# Patient Record
Sex: FEMALE | Race: WHITE | URBAN - METROPOLITAN AREA
[De-identification: names, ages, dates, MRNs, and addresses within clinical notes are randomized per-mention and may not be internally consistent; named-entity substitution may affect disease eponyms.]

---

## 2018-06-04 ENCOUNTER — THERAPY VISIT (OUTPATIENT)
Dept: PHYSICAL THERAPY | Facility: CLINIC | Age: 73
End: 2018-06-04
Payer: COMMERCIAL

## 2018-06-04 DIAGNOSIS — M25.511 ACUTE PAIN OF RIGHT SHOULDER: Primary | ICD-10-CM

## 2018-06-04 PROCEDURE — 97110 THERAPEUTIC EXERCISES: CPT | Mod: GP | Performed by: PHYSICAL THERAPIST

## 2018-06-04 PROCEDURE — 97161 PT EVAL LOW COMPLEX 20 MIN: CPT | Mod: GP | Performed by: PHYSICAL THERAPIST

## 2018-06-04 NOTE — LETTER
[facility]  [address] [address] [place] [address]  [phone]    2018    Re: Silvana Ricardo   :   1945  MRN:  [id number]   REFERRING PHYSICIAN:   Lawanda Martinez    [facility]    Date of Initial Evaluation:  2018  Visits:  Rxs Used: 1  Reason for Referral:  Acute pain of right shoulder    EVALUATION SUMMARY    [facility] Initial Evaluation  Subjective:  Patient is a 72 year old female presenting with rehab right shoulder hpi. The history is provided by the patient. No  was used.   Silvana Ricardo is a 72 year old female with a right shoulder condition.  Condition occurred with:  A fall.  Condition occurred: at home (tripped on bed spread landing on wood floor).  This is a new condition  2018 fell fracture right humerus - Marisela was hospitalized for 3 days and received rehab at [facility] 3 weeks.  Marisela wore sling for 6 weeks. Patient reports pain:  In the joint.    Pain is described as aching and is intermittent and reported as 3/10.  Associated symptoms:  Loss of strength. Pain is the same all the time.  Symptoms are exacerbated by using arm behind back and using arm overhead and relieved by rest.  Since onset symptoms are gradually improving.  Special tests:  X-ray.  Previous treatment includes physical therapy.  There was significant improvement following previous treatment.  General health as reported by patient is good. Pertinent medical history includes:  History of fractures, diabetes, overweight, high blood pressure and depression.  Medical allergies: no.  Other surgeries include:  Orthopedic surgery.  Current medications:  Anti-depressants and high blood pressure medication.  Current occupation is Retired.     Barriers include:  Lives alone.  Red flags:  None as reported by the patient.    Objective:  Standing Alignment:    Shoulder/UE:  Rounded shoulders, protracted scapula L and  protracted scapula R  Gait:    Gait Type:  Antalgic   Assistive Devices:  Cane  Flexibility/Screens:   Positive screens:  Shoulder  Upper Extremity:    Decreased left upper extremity flexibility at:  Pectoralis Major and Pectoralis Minor  Decreased right upper extremity flexibility present at:  Pectoralis Major and Pectoralis Minor          Shoulder Evaluation:  ROM:  AROM:    Flexion:  Left:  155    Right:  115+  Abduction:  Left: 160   Right:  150  Flexion/External Rotation:  Left:  T1    Right:  T1+  Extension/Internal Rotation:  Left:  T10    Right:  L1+    PROM:    Flexion:  Right: 150    Abduction:  Right:  150  Internal Rotation:  Right:  35  External Rotation:  Right:  35  Strength:  : Right Shoulder Pain & Weak with IR (4+) , ABD (4-) and Flex (4+)  Stability Testing:  not assessed  Special Tests:    Right shoulder positive for the following special tests:Rotator cuff tear  Palpation:    Right shoulder tenderness present at: Supraspinatus and Subscapularis  Right shoulder tenderness not present at:Infraspinatus or Teres Minor  Mobility Tests:    Glenohumeral inferior right:  Hypomobile      Assessment/Plan:    Patient is a 72 year old female with right side shoulder complaints.    Patient has the following significant findings with corresponding treatment plan.                Diagnosis 1:  Right Shoulder Pain & Weakness post-humeral fracture  Pain -  manual therapy, self management, education and home program  Decreased ROM/flexibility - manual therapy, therapeutic exercise and home program  Decreased joint mobility - manual therapy, therapeutic exercise and home program  Decreased strength - therapeutic exercise, therapeutic activities and home program  Inflammation - self management/home program  Impaired muscle performance - neuro re-education and home program  Decreased function - therapeutic activities and home program    Therapy Evaluation Codes:   1) History comprised of:   Personal factors that  impact the plan of care:      Age and Time since onset of symptoms.    Comorbidity factors that impact the plan of care are:      Diabetes, Depression, High blood pressure, Overweight and History of fractures.     Medications impacting care: Anti-depressant and High blood pressure.  2) Examination of Body Systems comprised of:   Body structures and functions that impact the plan of care:      Shoulder.   Activity limitations that impact the plan of care are:      Reaching.  3) Clinical presentation characteristics are:   Stable/Uncomplicated.  4) Decision-Making    Low complexity using standardized patient assessment instrument and/or measureable assessment of functional outcome.  Cumulative Therapy Evaluation is: Low complexity.    Previous and current functional limitations:  (See Goal Flow Sheet for this information)    Short term and Long term goals: (See Goal Flow Sheet for this information)     Communication ability:  Patient appears to be able to clearly communicate and understand verbal and written communication and follow directions correctly.  Treatment Explanation - The following has been discussed with the patient:   RX ordered/plan of care  Anticipated outcomes  Possible risks and side effects  This patient would benefit from PT intervention to resume normal activities.   Rehab potential is good.    Frequency:  1 X week, once daily  Duration:  for 6 weeks  Discharge Plan:  Achieve all LTG.  Independent in home treatment program.  Return to previous functional level by discharge.  Reach maximal therapeutic benefit.    Thank you for your referral.    INQUIRIES  Therapist: Marah Adler, PT   [address] [address]  Phone: [phone]  Fax: [phone]

## 2018-06-04 NOTE — PROGRESS NOTES
[facility] Initial Evaluation  Subjective:  Patient is a 72 year old female presenting with rehab right shoulder hpi. The history is provided by the patient. No  was used.   Silvana Ricardo is a 72 year old female with a right shoulder condition.  Condition occurred with:  A fall.  Condition occurred: at home (tripped on bed spread landing on wood floor).  This is a new condition  Feb 23, 2018 fell fracture right humerus - Marisela was hospitalized for 3 days and received rehab at [facility] 3 weeks.  Marisela wore sling for 6 weeks.   .    Patient reports pain:  In the joint.    Pain is described as aching and is intermittent and reported as 3/10.  Associated symptoms:  Loss of strength. Pain is the same all the time.  Symptoms are exacerbated by using arm behind back and using arm overhead and relieved by rest.  Since onset symptoms are gradually improving.  Special tests:  X-ray.  Previous treatment includes physical therapy.  There was significant improvement following previous treatment.  General health as reported by patient is good.                  Barriers include:  Lives alone.    Red flags:  None as reported by the patient.                        Objective:  Standing Alignment:      Shoulder/UE:  Rounded shoulders, protracted scapula L and protracted scapula R              Gait:    Gait Type:  Antalgic   Assistive Devices:  Cane      Flexibility/Screens:   Positive screens:  Shoulder  Upper Extremity:    Decreased left upper extremity flexibility at:  Pectoralis Major and Pectoralis Minor    Decreased right upper extremity flexibility present at:  Pectoralis Major and Pectoralis Minor                           Shoulder Evaluation:  ROM:  AROM:    Flexion:  Left:  155    Right:  115+    Abduction:  Left: 160   Right:  150                Flexion/External Rotation:  Left:  T1    Right:  T1+  Extension/Internal Rotation:  Left:  T10    Right:  L1+    PROM:    Flexion:   Right: 150      Abduction:  Right:  150    Internal Rotation:  Right:  35  External Rotation:  Right:  35                    Strength:  : Right Shoulder Pain & Weak with IR (4+) , ABD (4-) and Flex (4+)                      Stability Testing:  not assessed      Special Tests:      Right shoulder positive for the following special tests:Rotator cuff tear  Palpation:      Right shoulder tenderness present at: Supraspinatus and Subscapularis  Right shoulder tenderness not present at:Infraspinatus or Teres Minor  Mobility Tests:        Glenohumeral inferior right:  Hypomobile                                             General     ROS    Assessment/Plan:    Patient is a 72 year old female with right side shoulder complaints.    Patient has the following significant findings with corresponding treatment plan.                Diagnosis 1:  Right Shoulder Pain & Weakness post-humeral fracture  Pain -  manual therapy, self management, education and home program  Decreased ROM/flexibility - manual therapy, therapeutic exercise and home program  Decreased joint mobility - manual therapy, therapeutic exercise and home program  Decreased strength - therapeutic exercise, therapeutic activities and home program  Inflammation - self management/home program  Impaired muscle performance - neuro re-education and home program  Decreased function - therapeutic activities and home program    Therapy Evaluation Codes:   1) History comprised of:   Personal factors that impact the plan of care:      Age and Time since onset of symptoms.    Comorbidity factors that impact the plan of care are:      Diabetes, Depression, High blood pressure, Overweight and History of fractures.     Medications impacting care: Anti-depressant and High blood pressure.  2) Examination of Body Systems comprised of:   Body structures and functions that impact the plan of care:      Shoulder.   Activity limitations that impact the plan of care are:       Reaching.  3) Clinical presentation characteristics are:   Stable/Uncomplicated.  4) Decision-Making    Low complexity using standardized patient assessment instrument and/or measureable assessment of functional outcome.  Cumulative Therapy Evaluation is: Low complexity.    Previous and current functional limitations:  (See Goal Flow Sheet for this information)    Short term and Long term goals: (See Goal Flow Sheet for this information)     Communication ability:  Patient appears to be able to clearly communicate and understand verbal and written communication and follow directions correctly.  Treatment Explanation - The following has been discussed with the patient:   RX ordered/plan of care  Anticipated outcomes  Possible risks and side effects  This patient would benefit from PT intervention to resume normal activities.   Rehab potential is good.    Frequency:  1 X week, once daily  Duration:  for 6 weeks  Discharge Plan:  Achieve all LTG.  Independent in home treatment program.  Return to previous functional level by discharge.  Reach maximal therapeutic benefit.    Please refer to the daily flowsheet for treatment today, total treatment time and time spent performing 1:1 timed codes.

## 2018-06-18 ENCOUNTER — THERAPY VISIT (OUTPATIENT)
Dept: PHYSICAL THERAPY | Facility: CLINIC | Age: 73
End: 2018-06-18
Payer: COMMERCIAL

## 2018-06-18 DIAGNOSIS — M25.511 ACUTE PAIN OF RIGHT SHOULDER: ICD-10-CM

## 2018-06-18 PROCEDURE — 97112 NEUROMUSCULAR REEDUCATION: CPT | Mod: GP | Performed by: PHYSICAL THERAPIST

## 2018-06-18 PROCEDURE — 97110 THERAPEUTIC EXERCISES: CPT | Mod: GP | Performed by: PHYSICAL THERAPIST

## 2018-06-29 ENCOUNTER — THERAPY VISIT (OUTPATIENT)
Dept: PHYSICAL THERAPY | Facility: CLINIC | Age: 73
End: 2018-06-29
Payer: COMMERCIAL

## 2018-06-29 DIAGNOSIS — M25.511 ACUTE PAIN OF RIGHT SHOULDER: ICD-10-CM

## 2018-06-29 PROCEDURE — 97110 THERAPEUTIC EXERCISES: CPT | Mod: GP | Performed by: PHYSICAL THERAPIST

## 2018-06-29 PROCEDURE — 97112 NEUROMUSCULAR REEDUCATION: CPT | Mod: GP | Performed by: PHYSICAL THERAPIST

## 2018-07-09 ENCOUNTER — THERAPY VISIT (OUTPATIENT)
Dept: PHYSICAL THERAPY | Facility: CLINIC | Age: 73
End: 2018-07-09
Payer: COMMERCIAL

## 2018-07-09 DIAGNOSIS — M25.511 ACUTE PAIN OF RIGHT SHOULDER: ICD-10-CM

## 2018-07-09 PROCEDURE — 97112 NEUROMUSCULAR REEDUCATION: CPT | Mod: GP | Performed by: PHYSICAL THERAPIST

## 2018-07-09 PROCEDURE — 97110 THERAPEUTIC EXERCISES: CPT | Mod: GP | Performed by: PHYSICAL THERAPIST

## 2018-07-09 NOTE — PROGRESS NOTES
Subjective:  HPI                    Objective:  System    Physical Exam    General     ROS    Assessment/Plan:    DISCHARGE REPORT    Progress reporting period is from 6-4-18 to 7-9-18.       SUBJECTIVE  Subjective changes noted by patient:  Marisela denies right shoulder pain. She is able to reach overhead with ease.     Current pain level is 0/10  .     Previous pain level was: 7/10.   Changes in function:  Yes (See Goal flowsheet attached for changes in current functional level)  Adverse reaction to treatment or activity: None    OBJECTIVE  Changes noted in objective findings: AROM right shoulder FLex = 150, Abd = 160, IR/Ext = T12 and ER/Flex = T1 w/o pain reported. Strength with MMT = 4+ with mild pain with resisted Abd @ 90 degrees.      ASSESSMENT/PLAN  Updated problem list and treatment plan: Diagnosis 1:  Right Shoulder Pain   Pain -  manual therapy, self management, education and home program  Decreased ROM/flexibility - manual therapy, therapeutic exercise and therapeutic activity  Decreased joint mobility - manual therapy, therapeutic exercise, therapeutic activity and home program  Decreased strength - therapeutic exercise, therapeutic activities and home program  Impaired muscle performance - neuro re-education and home program  Decreased function - therapeutic activities and home program  STG/LTGs have been met or progress has been made towards goals:  Yes (See Goal flow sheet completed today.)  Assessment of Progress: The patient has met all of their long term goals.  Self Management Plans:  Patient is independent in a home treatment program.  Patient is independent in self management of symptoms.  I have re-evaluated this patient and find that the nature, scope, duration and intensity of the therapy is appropriate for the medical condition of the patient.  Silvana continues to require the following intervention to meet STG and LTG's:  PT intervention is no longer required to meet  STG/LTG.    Recommendations:  This patient is ready to be discharged from therapy and continue their home treatment program.    Please refer to the daily flowsheet for treatment today, total treatment time and time spent performing 1:1 timed codes.

## 2018-07-09 NOTE — LETTER
[facility]  [address] [address] [place] [address]  [phone]    2018    Re: Silvana Ricardo   :   1945  MRN:  [id number]   REFERRING PHYSICIAN:   Lawanda Martinez    [facility]    Date of Initial Evaluation:  2018  Visits:  Rxs Used: 4  Reason for Referral:  Acute pain of right shoulder    DISCHARGE REPORT  Progress reporting period is from 18 to 18.       SUBJECTIVE  Subjective changes noted by patient:  Marisela denies right shoulder pain. She is able to reach overhead with ease.   Current pain level is 0/10.   Previous pain level was: 7/10.   Changes in function:  Yes (See Goal flowsheet attached for changes in current functional level).  Adverse reaction to treatment or activity: None    OBJECTIVE  Changes noted in objective findings: AROM right shoulder FLex = 150, Abd = 160, IR/Ext = T12 and ER/Flex = T1 w/o pain reported. Strength with MMT = 4+ with mild pain with resisted Abd @ 90 degrees.      ASSESSMENT/PLAN  Updated problem list and treatment plan: Diagnosis 1:  Right Shoulder Pain   Pain -  manual therapy, self management, education and home program  Decreased ROM/flexibility - manual therapy, therapeutic exercise and therapeutic activity  Decreased joint mobility - manual therapy, therapeutic exercise, therapeutic activity and home program  Decreased strength - therapeutic exercise, therapeutic activities and home program  Impaired muscle performance - neuro re-education and home program  Decreased function - therapeutic activities and home program  STG/LTGs have been met or progress has been made towards goals:  Yes (See Goal flow sheet completed today.)  Assessment of Progress: The patient has met all of their long term goals.  Self Management Plans:  Patient is independent in a home treatment program.  Patient is independent in self management of symptoms.  I have re-evaluated this patient and find that the nature, scope,  duration and intensity of the therapy is appropriate for the medical condition of the patient.  Silvana continues to require the following intervention to meet STG and LTG's:  PT intervention is no longer required to meet STG/LTG.    Recommendations:  This patient is ready to be discharged from therapy and continue their home treatment program.  Thank you for your referral.    INQUIRIES  Therapist: SANGITA Garcia [address] [address]  Phone: [phone]  Fax: [phone]

## 2022-11-10 ENCOUNTER — MEDICAL CORRESPONDENCE (OUTPATIENT)
Dept: HEALTH INFORMATION MANAGEMENT | Facility: CLINIC | Age: 77
End: 2022-11-10

## 2024-08-14 ENCOUNTER — TRANSCRIBE ORDERS (OUTPATIENT)
Dept: OTHER | Age: 79
End: 2024-08-14

## 2024-08-14 DIAGNOSIS — N32.81 OVERACTIVE BLADDER: Primary | ICD-10-CM
